# Patient Record
(demographics unavailable — no encounter records)

---

## 2024-12-02 NOTE — DATA REVIEWED
[FreeTextEntry1] : Records reviewed [id number] including visit notes, labs, growth data: Dr. Jamie CARR records: age 4-12y.   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  Started GH 2/2019 on Zomacton with improved growth and catch aup to 3rd percentile at 10yo.  Last visit note 7/2022 issues with compliance, BA 11y3m @ CA 12y3m -Primary nocturnal enuresis, DDAVP after r/o DI/DM - 2018 MRI thin inferior infundibulum, 2020 MRI nl pit

## 2024-12-02 NOTE — CONSULT LETTER
[Dear  ___] : Dear  [unfilled], [Consult Letter:] : I had the pleasure of evaluating your patient, [unfilled]. [( Thank you for referring [unfilled] for consultation for _____ )] : Thank you for referring [unfilled] for consultation for [unfilled] [Please see my note below.] : Please see my note below. [Consult Closing:] : Thank you very much for allowing me to participate in the care of this patient.  If you have any questions, please do not hesitate to contact me. [Sincerely,] : Sincerely, [FreeTextEntry3] : Sharon Massey MD Director, Pediatric Endocrinology [facility], [facility]  of Pediatrics  [facility] [employer] at [facility]

## 2024-12-02 NOTE — DATA REVIEWED
[FreeTextEntry1] : Records reviewed [id number] including visit notes, labs, growth data: Dr. Jamie CARR records: age 4-12y.   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  Started GH 2/2019 on Zomacton with improved growth and catch aup to 3rd percentile at 10yo.  Last visit note 7/2022 issues with compliance, BA 11y3m @ CA 12y3m -Primary nocturnal enuresis, DDAVP after r/o DI/DM - 2018 MRI thin inferior infundibulum, 2020 MRI nl pit

## 2024-12-02 NOTE — PHYSICAL EXAM
[Healthy Appearing] : healthy appearing [Well Nourished] : well nourished [Interactive] : interactive [Normal Appearance] : normal appearance [Well formed] : well formed [Normally Set] : normally set [Normal S1 and S2] : normal S1 and S2 [Clear to Ausculation Bilaterally] : clear to auscultation bilaterally [Abdomen Soft] : soft [Abdomen Tenderness] : non-tender [] : no hepatosplenomegaly [Normal] : normal  [Looks Younger than Stated Years] : looks younger than stated years [WNL for age] : within normal limits of age [3] : was Franklin stage 3 [Testes] : normal [___] : [unfilled] [Dysmorphic] : non-dysmorphic [Goiter] : no goiter [Murmur] : no murmurs [Scoliosis] : scoliosis not appreciated [Short Metatarsals] : no short metatarsals [Valgus/Varus LE Deformity] : no valgus/varus LE deformity [de-identified] : short, nl proportions [de-identified] : nl oropharynx [de-identified] : nl patellar DTRs [de-identified] : no leg length discrepancy

## 2024-12-02 NOTE — HISTORY OF PRESENT ILLNESS
[FreeTextEntry2] : Adilson is a 14y8m M referred for transfer of care.  Previously followed by Dr. Ayala at [facility] until 2022.  At that time stopped GH, wasn't consistent and hard to get to [place].  Returns now because concerned about height, wants to resume.  Since stopped grew very little, but feels better in last 6 month - says had leftover GH and says took 1.4mg daily x 1m and grew 1 inch. Shoe size did increase in the last year (36), did outgrew clothes - fells all in last months. Not sure if pubertal. Generally healthy. Concerta started only 2 weeks ago.  Continues to get GH from [facility] specialty pharmacy so appears still has active PA, getting shipments of Norditropin 15mg [TWNoteComboBox1] : short stature

## 2024-12-02 NOTE — REVIEW OF SYSTEMS
[Nl] : Neurological [Short Stature] : short stature was noted [Change in Activity] : no change in activity [Joint Pains] : no arthralgias [Change in Vision] : no change in vision  [Shortness of Breath] : no shortness of breath [Diarrhea] : no diarrhea [Abdominal Pain] : no abdominal pain [Constipation] : no constipation [Urinary Frequency] : no urinary frequency [Headache] : no headache

## 2024-12-02 NOTE — FAMILY HISTORY
[___ inches] : [unfilled] inches [FreeTextEntry1] : ave pub, short for his family [FreeTextEntry5] : 13 [FreeTextEntry4] : MGM 64 MGF 70 PGM 64 PGF 70 [FreeTextEntry2] : 7 siblings healthy - 15yo brother 67-68, younger sibs good growth

## 2024-12-02 NOTE — PAST MEDICAL HISTORY
[At Term] : at term [Normal Vaginal Route] : by normal vaginal route [None] : there were no delivery complications [Age Appropriate] : age appropriate developmental milestones met [FreeTextEntry1] : [id number]

## 2024-12-02 NOTE — PHYSICAL EXAM
[Healthy Appearing] : healthy appearing [Well Nourished] : well nourished [Interactive] : interactive [Normal Appearance] : normal appearance [Well formed] : well formed [Normally Set] : normally set [Normal S1 and S2] : normal S1 and S2 [Clear to Ausculation Bilaterally] : clear to auscultation bilaterally [Abdomen Soft] : soft [Abdomen Tenderness] : non-tender [] : no hepatosplenomegaly [Normal] : normal  [Looks Younger than Stated Years] : looks younger than stated years [WNL for age] : within normal limits of age [3] : was Frankiln stage 3 [Testes] : normal [___] : [unfilled] [Dysmorphic] : non-dysmorphic [Goiter] : no goiter [Murmur] : no murmurs [Scoliosis] : scoliosis not appreciated [Short Metatarsals] : no short metatarsals [Valgus/Varus LE Deformity] : no valgus/varus LE deformity [de-identified] : short, nl proportions [de-identified] : nl oropharynx [de-identified] : nl patellar DTRs [de-identified] : no leg length discrepancy

## 2024-12-02 NOTE — PAST MEDICAL HISTORY
[At Term] : at term [Normal Vaginal Route] : by normal vaginal route [None] : there were no delivery complications [Age Appropriate] : age appropriate developmental milestones met [FreeTextEntry1] : [id number]

## 2024-12-02 NOTE — REASON FOR VISIT
[Consultation] : a consultation visit [Patient] : patient [Father] : father [Initial Eval - Existing Diagnosis] : an initial evaluation of an existing diagnosis

## 2024-12-02 NOTE — HISTORY OF PRESENT ILLNESS
[FreeTextEntry2] : Adilson is a 14y8m M referred for transfer of care.  Previously followed by Dr. Ayala at [facility] until 2022.  At that time stopped GH, wasn't consistent and hard to get to [place].  Returns now because concerned about height, wants to resume.  Since stopped grew very little, but feels better in last 6 month - says had leftover GH and says took 1.4mg daily x 1m and grew 1 inch. Shoe size did increase in the last year (36), did outgrew clothes - fells all in last months. Not sure if pubertal. Generally healthy. Concerta started only 2 weeks ago.  Continues to get GH from [facility] specialty pharmacy so appears still has active PA, getting shipments of Norditropin 15mg [TWNoteComboBox1] : short stature

## 2024-12-02 NOTE — DISCUSSION/SUMMARY
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  He was previously on GH therapy with excellent catch up growth but then 2 years ago discontinued and lost to follow-up.  Since significant fall of in growth, interested now in resuming care locally with us and resuming GH treatment. To restart growth hormone therapy at a dose of 0.3mg/kg/week.  We have reviewed today side effects of GH including hypothyroidism, scoliosis, SCFE. pseudotumor.  Reviewed need for consistent administration and for regular monitoring and follow-up.

## 2024-12-02 NOTE — CONSULT LETTER
[Dear  ___] : Dear  [unfilled], [Consult Letter:] : I had the pleasure of evaluating your patient, [unfilled]. [( Thank you for referring [unfilled] for consultation for _____ )] : Thank you for referring [unfilled] for consultation for [unfilled] [Please see my note below.] : Please see my note below. [Consult Closing:] : Thank you very much for allowing me to participate in the care of this patient.  If you have any questions, please do not hesitate to contact me. [Sincerely,] : Sincerely, [FreeTextEntry3] : Sharon Massey MD Director, Pediatric Endocrinology [facility], [facility]  of Pediatrics  [facility] [employer] [facility] at [facility]

## 2025-04-02 NOTE — FAMILY HISTORY
[___ inches] : [unfilled] inches [FreeTextEntry1] : ave pub, short for his family [FreeTextEntry5] : 13 [FreeTextEntry4] : MGM 64 MGF 70 PGM 64 PGF 70 [FreeTextEntry2] : 7 siblings healthy - 17yo brother 67-68, younger sibs good growth

## 2025-04-02 NOTE — FAMILY HISTORY
[___ inches] : [unfilled] inches [FreeTextEntry1] : ave pub, short for his family [FreeTextEntry4] : MGM 64 MGF 70 PGM 64 PGF 70 [FreeTextEntry5] : 13 [FreeTextEntry2] : 7 siblings healthy - 15yo brother 67-68, younger sibs good growth

## 2025-04-02 NOTE — HISTORY OF PRESENT ILLNESS
[FreeTextEntry2] : Adilson is a 15y M for follow-up of abnormal GH action/resistance.  Previously diagnosed and followed by Dr. Ayala at [facility].   Off GH [id number], grew very little, decided wanted to resume.  Restarted last visit at dose of 0.3mg/kg/wk 1.9mg but does miss 2-3x/wk.  Shoe size did not increase since last visit (36), did outgrow clothes. No intercurrent illness  Was on Concerta starting around last visit however was suppressing appetite.  Changed to Adderall which is impacting appetite less and working better.  Continues to get GH from [facility] specialty pharmacy so appears still has active PA, getting shipments of Norditropin 15mg [TWNoteComboBox1] : short stature

## 2025-04-02 NOTE — DATA REVIEWED
[FreeTextEntry1] : Records reviewed [id number] including visit notes, labs, growth data: Dr. Jamie CARR records: age 4-12y.   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  Started GH 2/2019 on Zomacton with improved growth and catch aup to 3rd percentile at 12yo.  Last visit note 7/2022 issues with compliance, BA 11y3m @ CA 12y3m -Primary nocturnal enuresis, DDAVP after r/o DI/DM - 2018 MRI thin inferior infundibulum, 2020 MRI nl pit  Imaging 4/1/25 BA 14y @ CA 15y

## 2025-04-02 NOTE — PHYSICAL EXAM
[Healthy Appearing] : healthy appearing [Well Nourished] : well nourished [Interactive] : interactive [Looks Younger than Stated Years] : looks younger than stated years [Normal Appearance] : normal appearance [WNL for age] : within normal limits of age [Normal S1 and S2] : normal S1 and S2 [Clear to Ausculation Bilaterally] : clear to auscultation bilaterally [Abdomen Soft] : soft [Abdomen Tenderness] : non-tender [] : no hepatosplenomegaly [3] : was Franklin stage 3 [Testes] : normal [___] : [unfilled] [Normal] : normal  [2/6 Ejection Systolic Murmur] : 2/6 ejection systolic murmur  [Dysmorphic] : non-dysmorphic [Goiter] : no goiter [de-identified] : short, GV 8cm/yr [de-identified] : nl oropharynx [de-identified] : nl patellar DTRs

## 2025-04-02 NOTE — CONSULT LETTER
[Dear  ___] : Dear  [unfilled], [Consult Letter:] : I had the pleasure of evaluating your patient, [unfilled]. [( Thank you for referring [unfilled] for consultation for _____ )] : Thank you for referring [unfilled] for consultation for [unfilled] [Please see my note below.] : Please see my note below. [Consult Closing:] : Thank you very much for allowing me to participate in the care of this patient.  If you have any questions, please do not hesitate to contact me. [Sincerely,] : Sincerely, [FreeTextEntry3] : Sharon Massey MD Director, Pediatric Endocrinology [facility], [facility]  of Pediatrics  [facility] at [facility]

## 2025-04-02 NOTE — PHYSICAL EXAM
[Healthy Appearing] : healthy appearing [Well Nourished] : well nourished [Interactive] : interactive [Looks Younger than Stated Years] : looks younger than stated years [Normal Appearance] : normal appearance [WNL for age] : within normal limits of age [Normal S1 and S2] : normal S1 and S2 [Clear to Ausculation Bilaterally] : clear to auscultation bilaterally [Abdomen Soft] : soft [Abdomen Tenderness] : non-tender [] : no hepatosplenomegaly [3] : was Franklin stage 3 [Testes] : normal [___] : [unfilled] [Normal] : normal  [2/6 Ejection Systolic Murmur] : 2/6 ejection systolic murmur  [Dysmorphic] : non-dysmorphic [Goiter] : no goiter [de-identified] : short, GV 8cm/yr [de-identified] : nl oropharynx [de-identified] : nl patellar DTRs

## 2025-04-02 NOTE — PAST MEDICAL HISTORY
[At Term] : at term [Normal Vaginal Route] : by normal vaginal route [None] : there were no delivery complications [Age Appropriate] : age appropriate developmental milestones met [FreeTextEntry1] : [id number]

## 2025-04-02 NOTE — CONSULT LETTER
[Dear  ___] : Dear  [unfilled], [Consult Letter:] : I had the pleasure of evaluating your patient, [unfilled]. [( Thank you for referring [unfilled] for consultation for _____ )] : Thank you for referring [unfilled] for consultation for [unfilled] [Please see my note below.] : Please see my note below. [Consult Closing:] : Thank you very much for allowing me to participate in the care of this patient.  If you have any questions, please do not hesitate to contact me. [Sincerely,] : Sincerely, [FreeTextEntry3] : Sharon Massey MD Director, Pediatric Endocrinology [facility], [facility]  of Pediatrics  [facility] [employer] at [facility]

## 2025-04-02 NOTE — DATA REVIEWED
[FreeTextEntry1] : Records reviewed [id number] including visit notes, labs, growth data: Dr. Jamie CARR records: age 4-12y.   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  Started GH 2/2019 on Zomacton with improved growth and catch aup to 3rd percentile at 12yo.  Last visit note 7/2022 issues with compliance, BA 11y3m @ CA 12y3m -Primary nocturnal enuresis, DDAVP after r/o DI/DM - 2018 MRI thin inferior infundibulum, 2020 MRI nl pit  Imaging 4/1/25 BA 14y @ CA 15y

## 2025-04-02 NOTE — DISCUSSION/SUMMARY
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  He was previously on GH therapy with excellent catch up growth but then discontinued x2y and lost to follow-up.  Subsequent significant fall off in growth, returned to us 12/2024 to resume GH treatment. Significantly improved growth velocity at this time, tolerating well.  Good weight gain.  Dose adjusted for weight.

## 2025-04-02 NOTE — HISTORY OF PRESENT ILLNESS
[FreeTextEntry2] : Adilson is a 15y M for follow-up of abnormal GH action/resistance.  Previously diagnosed and followed by Dr. Ayala at [facility].   Off GH [id number], grew very little, decided wanted to resume.  Restarted last visit at dose of 0.3mg/kg/wk 1.9mg but does miss 2-3x/wk.  Shoe size did not increase since last visit (36), did outgrow clothes. No intercurrent illness  Was on Concerta starting around last visit however was suppressing appetite.  Changed to Adderall which is impacting appetite less and working better.  Continues to get GH from [facility] specialty pharmacy so appears still has active PA, getting shipments of Norditropin 15mg [TWNoteComboBox1] : short stature

## 2025-04-02 NOTE — PAST MEDICAL HISTORY
[At Term] : at term [Normal Vaginal Route] : by normal vaginal route [None] : there were no delivery complications [Age Appropriate] : age appropriate developmental milestones met [FreeTextEntry1] : [id number]